# Patient Record
Sex: MALE | Race: ASIAN | NOT HISPANIC OR LATINO | URBAN - METROPOLITAN AREA
[De-identification: names, ages, dates, MRNs, and addresses within clinical notes are randomized per-mention and may not be internally consistent; named-entity substitution may affect disease eponyms.]

---

## 2021-06-05 ENCOUNTER — EMERGENCY (EMERGENCY)
Facility: HOSPITAL | Age: 66
LOS: 1 days | Discharge: HOME | End: 2021-06-05
Attending: STUDENT IN AN ORGANIZED HEALTH CARE EDUCATION/TRAINING PROGRAM | Admitting: STUDENT IN AN ORGANIZED HEALTH CARE EDUCATION/TRAINING PROGRAM
Payer: SELF-PAY

## 2021-06-05 VITALS
OXYGEN SATURATION: 96 % | SYSTOLIC BLOOD PRESSURE: 120 MMHG | DIASTOLIC BLOOD PRESSURE: 58 MMHG | HEART RATE: 57 BPM | RESPIRATION RATE: 18 BRPM

## 2021-06-05 PROCEDURE — 99053 MED SERV 10PM-8AM 24 HR FAC: CPT

## 2021-06-05 PROCEDURE — 99284 EMERGENCY DEPT VISIT MOD MDM: CPT

## 2021-06-05 NOTE — ED ADULT TRIAGE NOTE - CHIEF COMPLAINT QUOTE
Pt brought in by MANDI, handcuffed escorted by [facility] for ETOH. Family called stating pt has been drinking alcohol and being disruptive at home. Pt denies any abdominal pain.   Jamia, daughter, [phone] Pt brought in by [facility]I, handcuffed escorted by [facility] for ETOH. Family called stating pt has been drinking alcohol and being disruptive at home. Pt denies any abdominal pain. As per EMS, son recently passed away and has been drinking heavily.   Jamia, daughter, [phone] Pt brought in by MANDI, handcuffed escorted by [facility] for ETOH. Family called stating pt has been drinking alcohol and being disruptive at home. Pt denies any abdominal pain. As per EMS, son recently passed away and has been drinking heavily.   Jamia, daughter, [phone]    Belongings secured across from rm 21. Pt refusing to give cell phone.

## 2021-06-06 VITALS
TEMPERATURE: 99 F | SYSTOLIC BLOOD PRESSURE: 165 MMHG | OXYGEN SATURATION: 98 % | RESPIRATION RATE: 15 BRPM | HEART RATE: 92 BPM | DIASTOLIC BLOOD PRESSURE: 72 MMHG

## 2021-06-06 NOTE — ED ADULT NURSE NOTE - CHIEF COMPLAINT QUOTE
Pt brought in by [facility]I, handcuffed escorted by [facility] for ETOH. Family called stating pt has been drinking alcohol and being disruptive at home. Pt denies any abdominal pain. As per EMS, son recently passed away and has been drinking heavily.   Jamia, daughter, [phone]    Belongings secured across from rm 21. Pt refusing to give cell phone.

## 2021-06-06 NOTE — ED PROVIDER NOTE - ATTENDING CONTRIBUTION TO CARE
Reilly ALEJANDRE: I agree with the above provided history and exam and addend/modify it as follows.    65M w/ pmh alcohol use - bib EMS after called by family, stating that the patient has been drinking ETOH and being disruptive at home. Pt denies any complaints. Denies SI/HI. Pt himself appears mildly upset/agitated but redirectable, and not displaying any violent behavior in ER. exam is nonfocal, w/out evidence of trauma. Plan to d/w pt's family for collateral information. Pt likely okay for discharge w/ outpt f/u given lack of clear and present danger at this time, and not intoxicated to the point where needs to be observed for sobriety    I Luciano Grover MD performed a history and physical exam of the patient and discussed their management with the resident and /or advanced care provider. I reviewed the resident and /or ACP's note and agree with the documented findings and plan of care. My medical decision making and observations are found above.

## 2021-06-06 NOTE — ED ADULT NURSE NOTE - OBJECTIVE STATEMENT
Received PT to room 29, AAO4, ambulatory at baseline, BIBEMS after called for "disruption at home". PT admits to drinking, usually x 2-4 beers a day. Denies chest pain, SOB, headache, n/v/d, auditory/visual hallucinations. Presents to Ed respirations even and unlabored, ambulated independently with steady gait. Slight mumbling but able to converse. PT calm and cooperative. No tremors noted, able to follow commands. Denies SI, HI. VSS. Bed in lowest position. Plan for pickup in AM by family.

## 2021-06-06 NOTE — ED PROVIDER NOTE - OBJECTIVE STATEMENT
64yo male daily alcohol use BIBEMS after called for "disruption at home" per triage note. Patient admits to drinking earlier this evening. Does not know why he is here and denies any problems at home prior to arrival here. Denies trauma, any pain, SI/HI/AVH. Listed numbers with no answer or not working.

## 2021-06-06 NOTE — ED PROVIDER NOTE - NSFOLLOWUPINSTRUCTIONS_ED_ALL_ED_FT
- Continue all regular medications  - Reduce your alcohol intake  - Follow up with your primary doctor within 3 days  - You were given copies of labs and/or imaging results if applicable, please take them to your follow up appointments  - Return to the ER for any worsening symptoms or concerns
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
## 2021-06-06 NOTE — ED PROVIDER NOTE - PATIENT PORTAL LINK FT
You can access the FollowMyHealth Patient Portal offered by [facility] by registering at the following website: [email or web address]. By joining [employer]’s FollowMyHealth portal, you will also be able to view your health information using other applications (apps) compatible with our system.
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
## 2021-06-06 NOTE — ED ADULT NURSE NOTE - NSIMPLEMENTINTERV_GEN_ALL_ED
Implemented All Fall Risk Interventions:  [place] to call system. Call bell, personal items and telephone within reach. Instruct patient to call for assistance. Room bathroom lighting operational. Non-slip footwear when patient is off stretcher. Physically safe environment: no spills, clutter or unnecessary equipment. Stretcher in lowest position, wheels locked, appropriate side rails in place. Provide visual cue, wrist band, yellow gown, etc. Monitor gait and stability. Monitor for mental status changes and reorient to person, place, and time. Review medications for side effects contributing to fall risk. Reinforce activity limits and safety measures with patient and family.
